# Patient Record
Sex: FEMALE | Race: WHITE
[De-identification: names, ages, dates, MRNs, and addresses within clinical notes are randomized per-mention and may not be internally consistent; named-entity substitution may affect disease eponyms.]

---

## 2022-05-11 ENCOUNTER — HOSPITAL ENCOUNTER (INPATIENT)
Dept: HOSPITAL 79 - ER1 | Age: 57
LOS: 15 days | Discharge: HOME HEALTH | DRG: 291 | End: 2022-05-26
Attending: STUDENT IN AN ORGANIZED HEALTH CARE EDUCATION/TRAINING PROGRAM | Admitting: INTERNAL MEDICINE
Payer: COMMERCIAL

## 2022-05-11 VITALS — WEIGHT: 293 LBS | HEIGHT: 63 IN | BODY MASS INDEX: 51.91 KG/M2

## 2022-05-11 DIAGNOSIS — M86.8X7: ICD-10-CM

## 2022-05-11 DIAGNOSIS — I13.0: Primary | ICD-10-CM

## 2022-05-11 DIAGNOSIS — L03.116: ICD-10-CM

## 2022-05-11 DIAGNOSIS — N18.30: ICD-10-CM

## 2022-05-11 DIAGNOSIS — E11.69: ICD-10-CM

## 2022-05-11 DIAGNOSIS — E87.3: ICD-10-CM

## 2022-05-11 DIAGNOSIS — Z98.51: ICD-10-CM

## 2022-05-11 DIAGNOSIS — Z87.440: ICD-10-CM

## 2022-05-11 DIAGNOSIS — I50.33: ICD-10-CM

## 2022-05-11 DIAGNOSIS — J96.22: ICD-10-CM

## 2022-05-11 DIAGNOSIS — E11.42: ICD-10-CM

## 2022-05-11 DIAGNOSIS — E11.22: ICD-10-CM

## 2022-05-11 DIAGNOSIS — Z79.82: ICD-10-CM

## 2022-05-11 DIAGNOSIS — F17.210: ICD-10-CM

## 2022-05-11 DIAGNOSIS — R53.81: ICD-10-CM

## 2022-05-11 DIAGNOSIS — Z79.4: ICD-10-CM

## 2022-05-11 DIAGNOSIS — L89.612: ICD-10-CM

## 2022-05-11 DIAGNOSIS — E11.65: ICD-10-CM

## 2022-05-11 DIAGNOSIS — L03.115: ICD-10-CM

## 2022-05-11 DIAGNOSIS — J96.21: ICD-10-CM

## 2022-05-11 DIAGNOSIS — Z90.49: ICD-10-CM

## 2022-05-11 DIAGNOSIS — G89.4: ICD-10-CM

## 2022-05-11 DIAGNOSIS — N17.9: ICD-10-CM

## 2022-05-11 DIAGNOSIS — G47.33: ICD-10-CM

## 2022-05-11 DIAGNOSIS — Z79.01: ICD-10-CM

## 2022-05-11 DIAGNOSIS — R80.9: ICD-10-CM

## 2022-05-11 DIAGNOSIS — E88.09: ICD-10-CM

## 2022-05-11 DIAGNOSIS — E66.01: ICD-10-CM

## 2022-05-11 DIAGNOSIS — E11.621: ICD-10-CM

## 2022-05-11 DIAGNOSIS — E87.5: ICD-10-CM

## 2022-05-11 DIAGNOSIS — Z91.040: ICD-10-CM

## 2022-05-11 DIAGNOSIS — E26.9: ICD-10-CM

## 2022-05-11 DIAGNOSIS — E03.9: ICD-10-CM

## 2022-05-11 DIAGNOSIS — Z20.822: ICD-10-CM

## 2022-05-11 DIAGNOSIS — J44.9: ICD-10-CM

## 2022-05-11 DIAGNOSIS — I87.2: ICD-10-CM

## 2022-05-11 LAB
BUN/CREATININE RATIO: 32 (ref 0–10)
HGB BLD-MCNC: 14 GM/DL (ref 12.3–15.3)
RED BLOOD COUNT: 5.33 M/UL (ref 4–5.1)
WHITE BLOOD COUNT: 14.6 K/UL (ref 4.5–11)

## 2022-05-11 PROCEDURE — A6212 FOAM DRG <=16 SQ IN W/BORDER: HCPCS

## 2022-05-11 PROCEDURE — U0002 COVID-19 LAB TEST NON-CDC: HCPCS

## 2022-05-11 PROCEDURE — P9047 ALBUMIN (HUMAN), 25%, 50ML: HCPCS

## 2022-05-12 LAB
BUN/CREATININE RATIO: 28 (ref 0–10)
BUN/CREATININE RATIO: 29 (ref 0–10)
HGB BLD-MCNC: 13.2 GM/DL (ref 12.3–15.3)
RED BLOOD COUNT: 5.12 M/UL (ref 4–5.1)
WHITE BLOOD COUNT: 12.7 K/UL (ref 4.5–11)

## 2022-05-13 LAB
BUN/CREATININE RATIO: 30 (ref 0–10)
BUN/CREATININE RATIO: 34 (ref 0–10)
HGB BLD-MCNC: 14.2 GM/DL (ref 12.3–15.3)
RED BLOOD COUNT: 5.52 M/UL (ref 4–5.1)
WHITE BLOOD COUNT: 12.3 K/UL (ref 4.5–11)

## 2022-05-13 NOTE — NUR
BEDSIDE REPORT PT WAS ASLEEP, DURING MY ASSESSMENT PT COULD WAKE UP AND TALK
TO ME, TELL ME HER NAME AND BIRTHDATE, BUT I HAD TO ASK SEVERAL TIMES BECAUSE
PT WOULD FALL ASLEEP WHILE ANSWERING ME.
DURING MED PASS I SAT PT STRAIGHT UP AND GAVE HER A DRINK PRIOR TO GIVING HER
MEDS. SHE STRANGELED ON THE DRINK AND IT TOOK A FEW MINUTES TO GET HER BREATH
WHERE SHE SWALLOWED GOOD. I HELD HER PO MEDS AND NOTIFIED DR. LANE,
THINKING SHE MIGHT HAVE ASPIRATED EITHER THE DRINK OR POSSIBLY EARLIER TODAY.
CHEST XRAY ORDERED BY DR. LANE FOR MORNING WAS THE ONLY NEW ORDER.

## 2022-05-13 NOTE — NUR
DR. RUIZ CALLED BACK AND RN NOTIFED HIM OF CRITICAL POTASSIUM. MD STATED HE
WOULD PUT IN NEW ORDERS.

## 2022-05-14 LAB — BUN/CREATININE RATIO: 41 (ref 0–10)

## 2022-05-15 LAB — BUN/CREATININE RATIO: 46 (ref 0–10)

## 2022-05-16 LAB
BUN/CREATININE RATIO: 48 (ref 0–10)
HGB BLD-MCNC: 14.2 GM/DL (ref 12.3–15.3)
RED BLOOD COUNT: 5.52 M/UL (ref 4–5.1)
WHITE BLOOD COUNT: 8.3 K/UL (ref 4.5–11)

## 2022-05-18 LAB
BUN/CREATININE RATIO: 44 (ref 0–10)
HGB BLD-MCNC: 13.3 GM/DL (ref 12.3–15.3)
RED BLOOD COUNT: 5.26 M/UL (ref 4–5.1)
WHITE BLOOD COUNT: 10.2 K/UL (ref 4.5–11)

## 2022-05-18 PROCEDURE — 5A09357 ASSISTANCE WITH RESPIRATORY VENTILATION, LESS THAN 24 CONSECUTIVE HOURS, CONTINUOUS POSITIVE AIRWAY PRESSURE: ICD-10-PCS | Performed by: STUDENT IN AN ORGANIZED HEALTH CARE EDUCATION/TRAINING PROGRAM

## 2022-05-19 LAB
BUN/CREATININE RATIO: 38 (ref 0–10)
HGB BLD-MCNC: 12.9 GM/DL (ref 12.3–15.3)
RED BLOOD COUNT: 4.97 M/UL (ref 4–5.1)
WHITE BLOOD COUNT: 10.7 K/UL (ref 4.5–11)

## 2022-05-19 PROCEDURE — 5A09357 ASSISTANCE WITH RESPIRATORY VENTILATION, LESS THAN 24 CONSECUTIVE HOURS, CONTINUOUS POSITIVE AIRWAY PRESSURE: ICD-10-PCS | Performed by: STUDENT IN AN ORGANIZED HEALTH CARE EDUCATION/TRAINING PROGRAM

## 2022-05-20 LAB
BUN/CREATININE RATIO: 37 (ref 0–10)
HGB BLD-MCNC: 13.1 GM/DL (ref 12.3–15.3)
RED BLOOD COUNT: 5.07 M/UL (ref 4–5.1)
WHITE BLOOD COUNT: 9.7 K/UL (ref 4.5–11)

## 2022-05-21 LAB
BUN/CREATININE RATIO: 33 (ref 0–10)
HGB BLD-MCNC: 12.6 GM/DL (ref 12.3–15.3)
RED BLOOD COUNT: 4.96 M/UL (ref 4–5.1)
WHITE BLOOD COUNT: 8.5 K/UL (ref 4.5–11)

## 2022-05-21 NOTE — NUR
PATIENT HAD AN UNWITTNESSED FALL, SHE SAID SHE EASED DOWN TO THE FLOOR
AND IS NOT INJURED. DR. LANE IS AWARE AND NO NEW ORDERS WERE GIVEN.
APPARENTLY HER  CONVINCED HER HE COULD HELP HER AMBULATE WITH THE
WALKER AND THE WALKER FOLDED IN ON ON SIDE CAUSING HER TO LOSE BALANCE. I
SPOKE WITH HER  WHO WAS SOMEWHAT CONFRONTATIONAL WHEN TOLD THAT SHE
COULD NOT GET OUT OF BED WITHOUT PHYSICAL THERAPY. HE BLAMED THE DOCTOR FOR
SAYING SHE NEEDED TO WALK MORE DESPITE BEING TOLD TO CALL OUT FOR ANY NEEDS AT
THE START OF THE SHIFT AND BEING PRESENT WHEN PHYSICAL THERAPY ATTEMPTED TO
AMBULATE HER PREVIOUSLY.

## 2022-05-22 LAB
BUN/CREATININE RATIO: 31 (ref 0–10)
HGB BLD-MCNC: 13.6 GM/DL (ref 12.3–15.3)
RED BLOOD COUNT: 5.44 M/UL (ref 4–5.1)
WHITE BLOOD COUNT: 11.2 K/UL (ref 4.5–11)

## 2022-05-23 LAB
BUN/CREATININE RATIO: 33 (ref 0–10)
HGB BLD-MCNC: 13.2 GM/DL (ref 12.3–15.3)
RED BLOOD COUNT: 5.24 M/UL (ref 4–5.1)
WHITE BLOOD COUNT: 9 K/UL (ref 4.5–11)

## 2022-05-23 PROCEDURE — 5A09357 ASSISTANCE WITH RESPIRATORY VENTILATION, LESS THAN 24 CONSECUTIVE HOURS, CONTINUOUS POSITIVE AIRWAY PRESSURE: ICD-10-PCS | Performed by: STUDENT IN AN ORGANIZED HEALTH CARE EDUCATION/TRAINING PROGRAM

## 2022-05-24 LAB
BUN/CREATININE RATIO: 30 (ref 0–10)
HGB BLD-MCNC: 12.6 GM/DL (ref 12.3–15.3)
RED BLOOD COUNT: 5.06 M/UL (ref 4–5.1)
WHITE BLOOD COUNT: 8.7 K/UL (ref 4.5–11)

## 2022-05-24 PROCEDURE — 5A09357 ASSISTANCE WITH RESPIRATORY VENTILATION, LESS THAN 24 CONSECUTIVE HOURS, CONTINUOUS POSITIVE AIRWAY PRESSURE: ICD-10-PCS | Performed by: STUDENT IN AN ORGANIZED HEALTH CARE EDUCATION/TRAINING PROGRAM

## 2022-05-25 LAB
BUN/CREATININE RATIO: 40 (ref 0–10)
HGB BLD-MCNC: 12.6 GM/DL (ref 12.3–15.3)
RED BLOOD COUNT: 4.94 M/UL (ref 4–5.1)
WHITE BLOOD COUNT: 8.5 K/UL (ref 4.5–11)

## 2022-05-25 PROCEDURE — 5A09357 ASSISTANCE WITH RESPIRATORY VENTILATION, LESS THAN 24 CONSECUTIVE HOURS, CONTINUOUS POSITIVE AIRWAY PRESSURE: ICD-10-PCS | Performed by: STUDENT IN AN ORGANIZED HEALTH CARE EDUCATION/TRAINING PROGRAM

## 2022-05-26 LAB
BUN/CREATININE RATIO: 41 (ref 0–10)
HGB BLD-MCNC: 12.3 GM/DL (ref 12.3–15.3)
RED BLOOD COUNT: 4.82 M/UL (ref 4–5.1)
WHITE BLOOD COUNT: 7.7 K/UL (ref 4.5–11)

## 2022-05-26 PROCEDURE — 5A09357 ASSISTANCE WITH RESPIRATORY VENTILATION, LESS THAN 24 CONSECUTIVE HOURS, CONTINUOUS POSITIVE AIRWAY PRESSURE: ICD-10-PCS | Performed by: STUDENT IN AN ORGANIZED HEALTH CARE EDUCATION/TRAINING PROGRAM
